# Patient Record
Sex: MALE | Employment: FULL TIME | URBAN - METROPOLITAN AREA
[De-identification: names, ages, dates, MRNs, and addresses within clinical notes are randomized per-mention and may not be internally consistent; named-entity substitution may affect disease eponyms.]

---

## 2021-06-22 ENCOUNTER — TELEPHONE (OUTPATIENT)
Dept: FAMILY MEDICINE CLINIC | Facility: CLINIC | Age: 21
End: 2021-06-22

## 2021-06-23 ENCOUNTER — OFFICE VISIT (OUTPATIENT)
Dept: FAMILY MEDICINE CLINIC | Facility: CLINIC | Age: 21
End: 2021-06-23

## 2021-06-23 VITALS
WEIGHT: 177.4 LBS | BODY MASS INDEX: 26.89 KG/M2 | TEMPERATURE: 97.1 F | HEART RATE: 120 BPM | OXYGEN SATURATION: 98 % | HEIGHT: 68 IN | SYSTOLIC BLOOD PRESSURE: 150 MMHG | DIASTOLIC BLOOD PRESSURE: 78 MMHG

## 2021-06-23 DIAGNOSIS — L50.9 HIVES: Primary | ICD-10-CM

## 2021-06-23 DIAGNOSIS — Z00.01 ENCOUNTER FOR WELL ADULT EXAM WITH ABNORMAL FINDINGS: ICD-10-CM

## 2021-06-23 PROBLEM — H65.111 ACUTE MUCOID OTITIS MEDIA OF RIGHT EAR: Status: ACTIVE | Noted: 2019-09-20

## 2021-06-23 PROBLEM — J06.9 VIRAL URI WITH COUGH: Status: ACTIVE | Noted: 2019-09-20

## 2021-06-23 PROBLEM — F33.1 MODERATE EPISODE OF RECURRENT MAJOR DEPRESSIVE DISORDER (HCC): Status: ACTIVE | Noted: 2021-06-23

## 2021-06-23 PROBLEM — H65.191 ACUTE MUCOID OTITIS MEDIA OF RIGHT EAR: Status: ACTIVE | Noted: 2019-09-20

## 2021-06-23 PROCEDURE — 99203 OFFICE O/P NEW LOW 30 MIN: CPT | Performed by: FAMILY MEDICINE

## 2021-06-23 RX ORDER — METHYLPREDNISOLONE 4 MG/1
TABLET ORAL
COMMUNITY
Start: 2021-06-22 | End: 2021-10-12

## 2021-06-23 NOTE — PROGRESS NOTES
Chief Complaint  Rash, Anxiety, Depression, Sleep Disturbances, Adenopathy, and Establish Care    Subjective          Luis Harris presents to [facility] for     History of Present Illness     Pt is here to [facility]. [facility].     Pt denies having a Physical in the last year.    Pt is unsure if he has been screened for HEP C in the past.    *PT SCORED 24 ON DEPRESSION SCREENING*    Patient states has been having hives on and off for about 2-1/2 years.  He went to the urgent care the other day and was advised to come to primary care provider.  He has not been able to identify a trigger.    He was diagnosed with mono yesterday at the urgent care.    He states he has been depressed for many years, and it has been worse for the past 3-4 years. He has seen a therapist 2-3 years ago. He has never been on any medicine for depression. No SI/HI. He has good support with his family.    Health Maintenance Due   Topic Date Due   • ANNUAL PHYSICAL  Never done   • HEPATITIS C SCREENING  Never done        reports that he has quit smoking. He has never used smokeless tobacco. He reports current alcohol use. He reports current drug use. Drug: Marijuana.    PHQ-9 Depression Screening  Little interest or pleasure in doing things? 3   Feeling down, depressed, or hopeless? 3   Trouble falling or staying asleep, or sleeping too much? 3   Feeling tired or having little energy? 3   Poor appetite or overeating? 3   Feeling bad about yourself - or that you are a failure or have let yourself or your family down? 3   Trouble concentrating on things, such as reading the newspaper or watching television? 3   Moving or speaking so slowly that other people could have noticed? Or the opposite - being so fidgety or restless that you have been moving around a lot more than usual? 0   Thoughts that you would be better off dead, or of hurting yourself in some way? 3   PHQ-9 Total Score 24   If you checked off any problems, how  "difficult have these problems made it for you to do your work, take care of things at home, or get along with other people? Somewhat difficult     No results found for: WBC, HGB, HCT, MCV, PLT  No results found for: GLUCOSE, BUN, CREATININE, EGFRIFNONA, EGFRIFAFRI, BCR, K, CO2, CALCIUM, PROTENTOTREF, ALBUMIN, LABIL2, BILIRUBIN, AST, ALT  No results found for: TSH  No results found for: HGBA1C  Brief Urine Lab Results     None          BP Readings from Last 12 Encounters:   06/23/21 150/78       Wt Readings from Last 12 Encounters:   06/23/21 80.5 kg (177 lb 6.4 oz)       Objective   Vital Signs:   /78 (BP Location: Left arm, Patient Position: Sitting, Cuff Size: Adult) Comment: Pt has taken antihistamines  Pulse 120   Temp 97.1 °F (36.2 °C) (Temporal)   Ht 172.7 cm (68\")   Wt 80.5 kg (177 lb 6.4 oz)   SpO2 98%   BMI 26.97 kg/m²     Physical Exam  Vitals and nursing note reviewed.   Constitutional:       General: He is not in acute distress.     Appearance: He is well-developed. He is not diaphoretic.   HENT:      Head: Normocephalic and atraumatic.   Pulmonary:      Effort: Pulmonary effort is normal.   Skin:     General: Skin is warm and dry.      Comments: There are multiple macular, circular skin lesions on the bilateral lower extremities.  They are well-defined.  He also has macular skin lesions on the bilateral upper extremities.  They are circular and blanchable.   Neurological:      Mental Status: He is alert and oriented to person, place, and time.   Psychiatric:         Behavior: Behavior normal.         Thought Content: Thought content normal.         Judgment: Judgment normal.        Result Review :                 Assessment and Plan    Problem List Items Addressed This Visit     Hives - Primary    Overview     6/23/2021: Patient states has been having hives on and off for about 2-1/2 years.  He went to the urgent care the other day and was advised to come to primary care provider.  He has " not been able to identify a trigger. Refer to Allergist.           Relevant Orders    Ambulatory Referral to Allergy (Completed)      Other Visit Diagnoses     Encounter for well adult exam with abnormal findings        Relevant Orders    CBC & Differential    Comprehensive Metabolic Panel    Lipid Panel With / Chol / HDL Ratio    TSH    UA / M With / Rflx Culture([facility] ONLY) - Urine, Clean Catch          Follow Up   Return in about 3 months (around 9/23/2021) for Annual physical with cbc cmp flp tsh ua prior.  Patient was given instructions and counseling regarding his condition or for health maintenance advice. Please see specific information pulled into the AVS if appropriate.

## 2021-06-23 NOTE — ASSESSMENT & PLAN NOTE
Pt advised and agrees to call today and schedule an appointment with [facility].

## 2021-06-23 NOTE — PATIENT INSTRUCTIONS
[address] [address]    The phone number is [phone]   Walk-in hours are as follows:    Monday: 8:30 am - 2:30 pm  Tuesday:8:30 am - 2:30 pm  Wednesday:8:30 am - 2:30 pm  Thursday:8:30 am - 2:30 pm

## 2021-10-10 DIAGNOSIS — R31.21 ASYMPTOMATIC MICROSCOPIC HEMATURIA: Primary | ICD-10-CM

## 2021-10-12 ENCOUNTER — OFFICE VISIT (OUTPATIENT)
Dept: FAMILY MEDICINE CLINIC | Facility: CLINIC | Age: 21
End: 2021-10-12

## 2021-10-12 VITALS
WEIGHT: 185 LBS | SYSTOLIC BLOOD PRESSURE: 120 MMHG | BODY MASS INDEX: 28.04 KG/M2 | DIASTOLIC BLOOD PRESSURE: 74 MMHG | OXYGEN SATURATION: 97 % | HEART RATE: 85 BPM | HEIGHT: 68 IN | TEMPERATURE: 97.1 F

## 2021-10-12 DIAGNOSIS — R80.8 OTHER PROTEINURIA: ICD-10-CM

## 2021-10-12 DIAGNOSIS — Z00.00 ENCOUNTER FOR WELL ADULT EXAM WITHOUT ABNORMAL FINDINGS: ICD-10-CM

## 2021-10-12 DIAGNOSIS — E66.3 OVERWEIGHT (BMI 25.0-29.9): Primary | ICD-10-CM

## 2021-10-12 PROCEDURE — 99395 PREV VISIT EST AGE 18-39: CPT | Performed by: FAMILY MEDICINE

## 2021-10-12 RX ORDER — TRAZODONE HYDROCHLORIDE 50 MG/1
TABLET ORAL
COMMUNITY
Start: 2021-09-13

## 2021-10-12 RX ORDER — SERTRALINE HYDROCHLORIDE 100 MG/1
TABLET, FILM COATED ORAL
COMMUNITY
Start: 2021-09-14

## 2021-10-12 NOTE — PROGRESS NOTES
Subjective   Luis Harris is a 20 y.o. male is here for annual physical exam.    Chief Complaint   Patient presents with   • Annual Exam       History of Present Illness     Pt is here for his APE. He has no complaints. He states he was not feeling well and was dehydrated when he left his urine sample last week.    Family History   Problem Relation Age of Onset   • Cancer Mother         Ovarian   • Anxiety disorder Mother    • Depression Mother    • No Known Problems Father        Social History     Socioeconomic History   • Marital status: Single   Tobacco Use   • Smoking status: Former Smoker   • Smokeless tobacco: Never Used   Vaping Use   • Vaping Use: Never used   Substance and Sexual Activity   • Alcohol use: Yes     Comment: Ocassionally   • Drug use: Yes     Types: Marijuana   • Sexual activity: Defer         Current Outpatient Medications:   •  sertraline (ZOLOFT) 100 MG tablet, , Disp: , Rfl:   •  traZODone (DESYREL) 50 MG tablet, , Disp: , Rfl:     Review of Systems    PHQ-9 Depression Screening  Little interest or pleasure in doing things? 0   Feeling down, depressed, or hopeless? 0   Trouble falling or staying asleep, or sleeping too much?     Feeling tired or having little energy?     Poor appetite or overeating?     Feeling bad about yourself - or that you are a failure or have let yourself or your family down?     Trouble concentrating on things, such as reading the newspaper or watching television?     Moving or speaking so slowly that other people could have noticed? Or the opposite - being so fidgety or restless that you have been moving around a lot more than usual?     Thoughts that you would be better off dead, or of hurting yourself in some way?     PHQ-9 Total Score 0   If you checked off any problems, how difficult have these problems made it for you to do your work, take care of things at home, or get along with other people?       BP Readings from Last 12 Encounters:   10/12/21 120/74    06/23/21 150/78        Wt Readings from Last 12 Encounters:   10/12/21 83.9 kg (185 lb)   06/23/21 80.5 kg (177 lb 6.4 oz)        Objective   Vitals:    10/12/21 1348   BP: 120/74   Pulse: 85   Temp: 97.1 °F (36.2 °C)   SpO2: 97%     Physical Exam  Vitals and nursing note reviewed.   Constitutional:       General: He is not in acute distress.     Appearance: He is well-developed. He is not diaphoretic.   HENT:      Head: Normocephalic and atraumatic.      Right Ear: External ear normal.      Left Ear: External ear normal.      Nose: Nose normal.      Mouth/Throat:      Pharynx: No oropharyngeal exudate.   Eyes:      General: No scleral icterus.        Right eye: No discharge.         Left eye: No discharge.      Conjunctiva/sclera: Conjunctivae normal.      Pupils: Pupils are equal, round, and reactive to light.   Neck:      Thyroid: No thyromegaly.      Vascular: No JVD.      Trachea: No tracheal deviation.   Cardiovascular:      Rate and Rhythm: Normal rate and regular rhythm.      Heart sounds: Normal heart sounds. No murmur heard.  No friction rub. No gallop.    Pulmonary:      Effort: Pulmonary effort is normal. No respiratory distress.      Breath sounds: Normal breath sounds. No stridor. No wheezing or rales.   Abdominal:      General: Bowel sounds are normal. There is no distension.      Palpations: Abdomen is soft. There is no mass.      Tenderness: There is no abdominal tenderness. There is no guarding or rebound.      Hernia: No hernia is present.   Genitourinary:     Penis: Normal.       Testes: Normal.   Musculoskeletal:         General: No tenderness or deformity. Normal range of motion.      Cervical back: Normal range of motion and neck supple.   Lymphadenopathy:      Cervical: No cervical adenopathy.   Skin:     General: Skin is warm and dry.      Coloration: Skin is not pale.      Findings: No erythema or rash.   Neurological:      Mental Status: He is alert and oriented to person, place, and  time.      Cranial Nerves: No cranial nerve deficit.      Sensory: No sensory deficit.      Motor: No abnormal muscle tone.      Coordination: Coordination normal.      Deep Tendon Reflexes: Reflexes normal.   Psychiatric:         Behavior: Behavior normal.         Thought Content: Thought content normal.         Judgment: Judgment normal.         Procedures      Assessment/Plan   Diagnoses and all orders for this visit:    1. Overweight (BMI 25.0-29.9) (Primary)    2. Other proteinuria  Assessment & Plan:   Repeat UA today.      3. Encounter for well adult exam without abnormal findings               No follow-ups on file.

## 2021-10-12 NOTE — PATIENT INSTRUCTIONS
Preventive Care 18-21 Years Old, Male  Preventive care refers to lifestyle choices and visits with your health care provider that can promote health and wellness. At this stage in your life, you may start seeing a primary care physician instead of a pediatrician. It is important to take responsibility for your health and well-being. Preventive care for young adults includes:  · A yearly physical exam. This is also called an annual wellness visit.  · Regular dental and eye exams.  · Immunizations.  · Screening for certain conditions.  · Healthy lifestyle choices, such as:  ? Eating a healthy diet.  ? Getting regular exercise.  ? Not using drugs or products that contain nicotine and tobacco.  ? Limiting alcohol use.  What can I expect for my preventive care visit?  Physical exam  Your health care provider may check your:  · Height and weight. These may be used to calculate your BMI (body mass index). BMI is a measurement that tells if you are at a healthy weight.  · Heart rate and blood pressure.  · Body temperature.  · Skin for abnormal spots.  Counseling  Your health care provider may ask you questions about your:  · Past medical problems.  · Family's medical history.  · Alcohol, tobacco, and drug use.  · Home life and relationship well-being.  · Access to firearms.  · Emotional well-being.  · Diet, exercise, and sleep habits.  · Sexual activity and sexual health.  What immunizations do I need?    Vaccines are usually given at various ages, according to a schedule. Your health care provider will recommend vaccines for you based on your age, medical history, and lifestyle or other factors, such as travel or where you work.  What tests do I need?  Blood tests  · Lipid and cholesterol levels. These may be checked every 5 years starting at age 20.  · Hepatitis C test.  · Hepatitis B test.  Screening  · Genital exam to check for testicular cancer or hernias.  · STD (sexually transmitted disease) testing, if you are at  risk.  Other tests  · Tuberculosis skin test.  · Vision and hearing tests.  · Skin exam.  Talk with your health care provider about your test results, treatment options, and if necessary, the need for more tests.  Follow these instructions at home:  Eating and drinking    · Eat a healthy diet that includes fresh fruits and vegetables, whole grains, lean protein, and low-fat dairy products.  · Drink enough fluid to keep your urine pale yellow.  · Do not drink alcohol if:  ? Your health care provider tells you not to drink.  ? You are under the legal drinking age. In the [place] the legal drinking age is 21.  · If you drink alcohol:  ? Limit how much you use to 0-2 drinks a day.  ? Be aware of how much alcohol is in your drink. In the [place] one drink equals one 12 oz bottle of beer (355 mL), one 5 oz glass of wine (148 mL), or one 1½ oz glass of hard liquor (44 mL).    Lifestyle  · Take daily care of your teeth and gums. Brush your teeth every morning and night with fluoride toothpaste. Floss one time each day.  · Stay active. Exercise for at least 30 minutes 5 or more days of the week.  · Do not use any products that contain nicotine or tobacco, such as cigarettes, e-cigarettes, and chewing tobacco. If you need help quitting, ask your health care provider.  · Do not use drugs.  · If you are sexually active, practice safe sex. Use a condom or other form of protection to prevent STIs (sexually transmitted infections).  · Find healthy ways to cope with stress, such as:  ? Meditation, yoga, or listening to music.  ? Journaling.  ? Talking to a trusted person.  ? Spending time with friends and family.  Safety  · Always wear your seat belt while driving or riding in a vehicle.  · Do not drive:  ? If you have been drinking alcohol. Do not ride with someone who has been drinking.  ? When you are tired or distracted.  ? While texting.  · Wear a helmet and other protective equipment during sports activities.  · If you have  firearms in your house, make sure you follow all gun safety procedures.  · Seek help if you have been bullied, physically abused, or sexually abused.  · Use the Internet responsibly to avoid dangers, such as online bullying and online sex predators.  What's next?  · Go to your health care provider once a year for an annual wellness visit.  · Ask your health care provider how often you should have your eyes and teeth checked.  · Stay up to date on all vaccines.  This information is not intended to replace advice given to you by your health care provider. Make sure you discuss any questions you have with your health care provider.  Document Revised: 09/02/2020 Document Reviewed: 12/12/2019  Elsevier Patient Education © 2021 [employer]

## 2021-10-13 LAB
APPEARANCE UR: CLEAR
BACTERIA #/AREA URNS HPF: NORMAL /HPF
BILIRUB UR QL STRIP: NEGATIVE
CASTS URNS QL MICRO: NORMAL /LPF
COLOR UR: YELLOW
EPI CELLS #/AREA URNS HPF: NORMAL /HPF (ref 0–10)
GLUCOSE UR QL: NEGATIVE
HGB UR QL STRIP: NEGATIVE
KETONES UR QL STRIP: NEGATIVE
LEUKOCYTE ESTERASE UR QL STRIP: NEGATIVE
MICRO URNS: ABNORMAL
MICRO URNS: ABNORMAL
NITRITE UR QL STRIP: NEGATIVE
PH UR STRIP: 8 [PH] (ref 5–7.5)
PROT UR QL STRIP: NEGATIVE
RBC #/AREA URNS HPF: NORMAL /HPF (ref 0–2)
SP GR UR: 1.01 (ref 1–1.03)
URINALYSIS REFLEX: ABNORMAL
UROBILINOGEN UR STRIP-MCNC: 0.2 MG/DL (ref 0.2–1)
WBC #/AREA URNS HPF: NORMAL /HPF (ref 0–5)

## 2024-01-19 ENCOUNTER — HOSPITAL ENCOUNTER (EMERGENCY)
Facility: HOSPITAL | Age: 24
Discharge: HOME | End: 2024-01-19
Attending: EMERGENCY MEDICINE
Payer: COMMERCIAL

## 2024-01-19 ENCOUNTER — APPOINTMENT (OUTPATIENT)
Dept: GENERAL RADIOLOGY | Facility: HOSPITAL | Age: 24
End: 2024-01-19
Payer: COMMERCIAL

## 2024-01-19 VITALS
HEIGHT: 68 IN | HEART RATE: 113 BPM | OXYGEN SATURATION: 99 % | WEIGHT: 184.97 LBS | SYSTOLIC BLOOD PRESSURE: 165 MMHG | TEMPERATURE: 98.1 F | DIASTOLIC BLOOD PRESSURE: 109 MMHG | RESPIRATION RATE: 16 BRPM | BODY MASS INDEX: 28.03 KG/M2

## 2024-01-19 DIAGNOSIS — S62.609B PHALANX, HAND FRACTURE, OPEN, INITIAL ENCOUNTER: ICD-10-CM

## 2024-01-19 DIAGNOSIS — I47.10 SVT (SUPRAVENTRICULAR TACHYCARDIA): Primary | ICD-10-CM

## 2024-01-19 DIAGNOSIS — S61.012A LACERATION OF LEFT THUMB WITHOUT FOREIGN BODY, NAIL DAMAGE STATUS UNSPECIFIED, INITIAL ENCOUNTER: ICD-10-CM

## 2024-01-19 LAB
QT INTERVAL: 251 MS
QTC INTERVAL: 482 MS

## 2024-01-19 PROCEDURE — 73130 X-RAY EXAM OF HAND: CPT

## 2024-01-19 PROCEDURE — 93010 ELECTROCARDIOGRAM REPORT: CPT | Performed by: INTERNAL MEDICINE

## 2024-01-19 PROCEDURE — 25010000002 BUPIVACAINE (PF) 0.5 % SOLUTION: Performed by: EMERGENCY MEDICINE

## 2024-01-19 PROCEDURE — 99283 EMERGENCY DEPT VISIT LOW MDM: CPT

## 2024-01-19 PROCEDURE — 93005 ELECTROCARDIOGRAM TRACING: CPT | Performed by: EMERGENCY MEDICINE

## 2024-01-19 RX ORDER — CEPHALEXIN 500 MG/1
500 CAPSULE ORAL ONCE
Status: COMPLETED | OUTPATIENT
Start: 2024-01-19 | End: 2024-01-19

## 2024-01-19 RX ORDER — BUPIVACAINE HYDROCHLORIDE 5 MG/ML
10 INJECTION, SOLUTION EPIDURAL; INTRACAUDAL ONCE
Status: COMPLETED | OUTPATIENT
Start: 2024-01-19 | End: 2024-01-19

## 2024-01-19 RX ORDER — CEPHALEXIN 500 MG/1
500 CAPSULE ORAL 3 TIMES DAILY
Qty: 21 CAPSULE | Refills: 0 | Status: SHIPPED | OUTPATIENT
Start: 2024-01-19 | End: 2024-01-26

## 2024-01-19 RX ADMIN — CEPHALEXIN 500 MG: 500 CAPSULE ORAL at 16:09

## 2024-01-19 RX ADMIN — BUPIVACAINE HYDROCHLORIDE 10 ML: 5 INJECTION, SOLUTION EPIDURAL; INTRACAUDAL; PERINEURAL at 15:17

## 2024-01-19 NOTE — DISCHARGE INSTRUCTIONS
Take antibiotic as directed.  Keep wound clean and covered until it is well-healed.  Must follow-up with hand surgery specialist next week for repeat evaluation and wound check as we discussed.  Also recommend follow-up with cardiologist for further evaluation of your elevated heart rate.  Please return to the emergency room for any worsening pain, swelling, redness, fevers, weakness, numbness in the finger or any other concerns.

## 2024-01-19 NOTE — ED PROVIDER NOTES
Laceration Repair    Date/Time: 1/19/2024 5:26 PM    Performed by: Janiya Goldberg PA  Authorized by: Elroy Rojas MD    Consent:     Consent obtained:  Verbal    Consent given by:  Patient    Risks, benefits, and alternatives were discussed: yes      Risks discussed:  Infection, need for additional repair and poor cosmetic result    Alternatives discussed:  No treatment  Universal protocol:     Patient identity confirmed:  Verbally with patient and arm band  Anesthesia:     Anesthesia method:  Local infiltration    Local anesthetic:  Bupivacaine 0.5% w/o epi  Laceration details:     Location:  Finger    Finger location:  L thumb    Length (cm):  3    Depth (mm):  3  Pre-procedure details:     Preparation:  Patient was prepped and draped in usual sterile fashion and imaging obtained to evaluate for foreign bodies  Exploration:     Hemostasis achieved with:  Direct pressure    Imaging outcome: foreign body not noted    Treatment:     Area cleansed with:  Chlorhexidine    Amount of cleaning:  Standard    Irrigation solution:  Sterile saline    Irrigation method:  Pressure wash    Visualized foreign bodies/material removed: no      Debridement:  None    Undermining:  None  Skin repair:     Repair method:  Sutures    Suture size:  4-0    Suture material:  Nylon    Suture technique:  Simple interrupted    Number of sutures:  6  Approximation:     Approximation:  Close  Repair type:     Repair type:  Simple  Post-procedure details:     Dressing:  Antibiotic ointment and non-adherent dressing    Procedure completion:  Tolerated well, no immediate complications         Janiya Goldberg PA  01/19/24 [id number]

## 2024-01-19 NOTE — ED PROVIDER NOTES
EMERGENCY DEPARTMENT ENCOUNTER  Room Number:  34/34  PCP: Jorge A Jones Jr.,   Independent Historians: Patient      HPI:  Chief Complaint: Thumb laceration    A complete HPI/ROS/PMH/PSH/SH/FH are unobtainable due to: None    Chronic or social conditions impacting patient care (Social Determinants of Health): None      Context: Luis Harris is a 23 y.o. male with a medical history of anxiety and depression who presents to the ED c/o acute laceration of the left thumb.  Patient works for a [employer].  Today he was doing his usual work duties with moving some objects.  Unfortunately because of the weather there was some snow and ice on the ground which caused him to slip suddenly and he struck his left hand against some aspect of the truck which resulted in a laceration to the left thumb.  This created a lot of bleeding to the thumb.  Patient applied some tissues to the thumb to try and stop the bleedin.  He did not hit his head or have any loss of consciousness.  He denies any other areas of injury or concern.      Review of prior external notes (non-ED) -and- Review of prior external test results outside of this encounter: I reviewed the PCP progress note from October 12, 2021 when patient had evaluation for proteinuria and encounter for well adult exam      PAST MEDICAL HISTORY  Active Ambulatory Problems     Diagnosis Date Noted    Acute mucoid otitis media of right ear 09/20/2019    Viral URI with cough 09/20/2019    Hives 06/23/2021    Moderate episode of recurrent major depressive disorder 06/23/2021    Asymptomatic microscopic hematuria 10/10/2021    Other proteinuria 10/12/2021     Resolved Ambulatory Problems     Diagnosis Date Noted    No Resolved Ambulatory Problems     Past Medical History:   Diagnosis Date    Anxiety     Depression     Insomnia     Monocytoses     Sleep disturbances          PAST SURGICAL HISTORY  History reviewed. No pertinent surgical history.      FAMILY  HISTORY  Family History   Problem Relation Age of Onset    Cancer Mother         Ovarian    Anxiety disorder Mother     Depression Mother     No Known Problems Father          SOCIAL HISTORY  Social History     Socioeconomic History    Marital status: Single   Tobacco Use    Smoking status: Former    Smokeless tobacco: Never   Vaping Use    Vaping Use: Never used   Substance and Sexual Activity    Alcohol use: Yes     Comment: Ocassionally    Drug use: Yes     Types: Marijuana    Sexual activity: Defer         ALLERGIES  Patient has no known allergies.      REVIEW OF SYSTEMS  Review of Systems  Included in HPI  All systems reviewed and negative except for those discussed in HPI.      PHYSICAL EXAM    I have reviewed the triage vital signs and nursing notes.    ED Triage Vitals   Temp Heart Rate Resp BP SpO2   01/19/24 1418 01/19/24 1418 01/19/24 1418 01/19/24 1433 01/19/24 1418   98.1 °F (36.7 °C) (!) 247 16 (!) 163/111 98 %      Temp src Heart Rate Source Patient Position BP Location FiO2 (%)   01/19/24 1418 01/19/24 1418 -- -- --   Tympanic Monitor          Physical Exam  GENERAL: Appears anxious, alert, no acute distress  SKIN: Warm, dry, no diaphoresis  HENT: Normocephalic, atraumatic  EYES: no scleral icterus, EOMI  CV: regular rhythm, tachycardic rate, no murmurs  RESPIRATORY: normal effort, lungs clear to auscultation bilaterally  ABDOMEN: soft, nontender, nondistended  MUSCULOSKELETAL: There is a laceration notable to the left first digit extending from the MCP distally beyond the interphalangeal joint at the dorsal lateral aspect.  Patient is able to flex and extend the thumb appropriately at the MCP and IP joints.  Distal sensation is intact at the tip of the thumb.  There does not appear to be any foreign body within the wound.  There is mild oozing of dark red blood present from the wound.  NEURO: alert, moves all extremities, follows commands, no focal motor or sensory deficits      NIH:                                                              LAB RESULTS  Recent Results (from the past 24 hour(s))   ECG 12 Lead Rhythm Change    Collection Time: 01/19/24  2:21 PM   Result Value Ref Range    QT Interval 251 ms    QTC Interval 482 ms   ECG 12 Lead Rhythm Change    Collection Time: 01/19/24  2:30 PM   Result Value Ref Range    QT Interval 279 ms    QTC Interval 422 ms         RADIOLOGY  XR Hand 3+ View Left    Result Date: 1/19/2024  XR HAND 3+ VW LEFT-  INDICATIONS: Thumb laceration  TECHNIQUE: 3 VIEWS OF THE LEFT HAND  COMPARISON: None available  FINDINGS:  Soft tissue swelling and injury/laceration are apparent at the dorsal lateral aspect of the mid thumb, with apparent avulsion fracture fragments, compatible with open fracture (versus foreign bodies), at the dorsal aspect of the base of the first distal phalanx. No other radiodensities are noted in the soft tissues in this region. No other evidence of fracture. No dislocation.       As described.    This report was finalized on 1/19/2024 3:02 PM by Dr. Lamont Mazariegos M.D on Workstation: [id number]         MEDICATIONS GIVEN IN ER  Medications   bupivacaine (PF) (MARCAINE) 0.5 % injection 10 mL (10 mL Injection Given by Other 1/19/24 9857)   cephalexin (KEFLEX) capsule 500 mg (500 mg Oral Given 1/19/24 1609)         ORDERS PLACED DURING THIS VISIT:  Orders Placed This Encounter   Procedures    Laceration Repair    XR Hand 3+ View Left    Ambulatory Referral to Cardiology    Please apply finger splint to the left thumb after laceration wound is repaired with sutures and dressed with gauze  Nursing Communication    ECG 12 Lead Rhythm Change    ECG 12 Lead Rhythm Change         OUTPATIENT MEDICATION MANAGEMENT:  No current Epic-ordered facility-administered medications on file.     Current Outpatient Medications Ordered in Epic   Medication Sig Dispense Refill    cephalexin (KEFLEX) 500 MG capsule Take 1 capsule by mouth 3 (Three) Times a Day for 7 days. 21  capsule 0    sertraline (ZOLOFT) 100 MG tablet       traZODone (DESYREL) 50 MG tablet            PROCEDURES  Procedures            PROGRESS, DATA ANALYSIS, CONSULTS, AND MEDICAL DECISION MAKING  All labs have been independently interpreted by me.  All radiology studies have been reviewed by me. All EKG's have been independently viewed and interpreted by me.  Discussion below represents my analysis of pertinent findings related to patient's condition, differential diagnosis, treatment plan and final disposition.    Differential diagnosis includes but is not limited to SVT, atrial fibrillation, atrial flutter, thumb laceration, finger fracture.    Clinical Scores:                  ED Course as of 01/19/24 2233   Fri Jan 19, 2024   1432 Patient just arrived via triage for evaluation of his thumb laceration.  When checking vital signs, though, he was found to have a heart rate in the 220-240 range.  Patient tells me he is feeling very anxious.  He also tells me that he smoked 3 cigarettes prior to coming in here.  I think these are probably playing a role with his tachycardia but obviously at this fast, it is consistent with SVT. [PAULA]   1433 We established an IV in the right antecubital fossa.  I had patient perform a Valsalva maneuver, holding his breath and valdez his abdominal muscles for approximately 30 seconds.  Then I put him in reverse Trendelenburg.  Fortunately, this maneuver worked excellently and he is now in a much more appropriate rate at approximately 100 2130 bpm.  We will continue to monitor him on telemetry. [PAULA]   1446 EKG         EKG time/Interp time: 1421/1431  Rhythm/Rate: SVT, 221 bpm  P waves and UT: Present, 158 ms  QRS, axis: 93 ms, normal axis  ST and T waves: Interpretation is somewhat limited because of excessive rate.  However there is no significant ST elevation pattern present.  There is ST depression which is almost certainly rate related  Independently interpreted by me  "contemporaneously with treatment   [PAULA]   1446 EKG         EKG time/Interp time: 1430/1432 (immediately after vagal maneuver)  Rhythm/Rate: Sinus tachycardia, 137 bpm  P waves and MD: Present, 125 ms  QRS, axis: 108 ms, normal axis  ST and T waves: No ST segment elevations are present.  Independently interpreted by me contemporaneously with treatment   [PAULA]   1546 I independently interpreted the x-ray of the left hand and my findings are: No dislocation, no obvious radiopaque foreign body evident.  There does appear to be small avulsion fracture present at the base of the phalanx [PAULA]   1546 Reassessed the patient and he is relaxing more comfortably.  Heart rate is now in the low 100s range. [PAULA]   1617 KIM SPENCER, performed digital block on the left thumb.  The wound has been copiously irrigated.  She will repair the laceration with sutures as appropriate.  Patient's heart rate remains in a sinus tachycardic rhythm.  Patient cardiology to discuss patient's new onset SVT today. [PAULA]   1625 I discussed with Dr. Souza from [facility] about the patient.  He is satisfied with the patient's current heart rate in the 115-120 range.  He feels there is no need for medications or any other kind of therapy right now.  He recommends that the patient simply follow-up with him in the office in the next 2 to 3 weeks for repeat evaluation.  We will give the patient resource information to do so. [PAULA]   1630 I discussed the plan of care with the patient at the bedside.  Explained that once his laceration is repaired, he will be discharged home with a prescription for antibiotics and he will need to follow-up hand surgery specialist next week for repeat evaluation.  Will give him resource information to follow-up with Dr. Aaron.  Also explained that he will need to follow-up in the cardiology office for repeat evaluation.  Resource information provided.  Will discuss with him usual \"return to ER\" instructions prior " to discharge as well. [PAULA]      ED Course User Index  [PAULA] Elroy Rojas MD           AS OF 22:33 EST VITALS:    BP - (!) 165/109  HR - 113  TEMP - 98.1 °F (36.7 °C) (Tympanic)  O2 SATS - 99%    COMPLEXITY OF CARE  Admission was considered but after careful review of the patient's presentation, physical examination, diagnostic results, and response to treatment the patient may be safely discharged with outpatient follow-up.      DIAGNOSIS  Final diagnoses:   SVT (supraventricular tachycardia)   Laceration of left thumb without foreign body, nail damage status unspecified, initial encounter   Phalanx, hand fracture, open, initial encounter         DISPOSITION  ED Disposition       ED Disposition   Discharge    Condition   Stable    Comment   --                Please note that portions of this document were completed with a voice recognition program.    Note Disclaimer: At [facility], we believe that sharing information builds trust and better relationships. You are receiving this note because you recently visited [facility]. It is possible you will see health information before a provider has talked with you about it. This kind of information can be easy to misunderstand. To help you fully understand what it means for your health, we urge you to discuss this note with your provider.         Elroy Rojas MD  01/19/24 1631       Elroy Rojas MD  01/19/24 1032

## 2024-01-20 LAB
QT INTERVAL: 279 MS
QTC INTERVAL: 422 MS

## 2024-03-08 ENCOUNTER — OFFICE VISIT (OUTPATIENT)
Dept: INTERNAL MEDICINE | Facility: CLINIC | Age: 24
End: 2024-03-08
Payer: COMMERCIAL

## 2024-03-08 VITALS
HEIGHT: 68 IN | HEART RATE: 95 BPM | BODY MASS INDEX: 34.65 KG/M2 | SYSTOLIC BLOOD PRESSURE: 140 MMHG | DIASTOLIC BLOOD PRESSURE: 90 MMHG | WEIGHT: 228.6 LBS | TEMPERATURE: 98.4 F | OXYGEN SATURATION: 96 %

## 2024-03-08 DIAGNOSIS — F33.1 MODERATE EPISODE OF RECURRENT MAJOR DEPRESSIVE DISORDER: ICD-10-CM

## 2024-03-08 DIAGNOSIS — F41.1 GAD (GENERALIZED ANXIETY DISORDER): ICD-10-CM

## 2024-03-08 DIAGNOSIS — Z76.89 ENCOUNTER TO ESTABLISH CARE: Primary | ICD-10-CM

## 2024-03-08 DIAGNOSIS — Z00.00 ROUTINE HEALTH MAINTENANCE: ICD-10-CM

## 2024-03-08 PROBLEM — H65.111 ACUTE MUCOID OTITIS MEDIA OF RIGHT EAR: Status: RESOLVED | Noted: 2019-09-20 | Resolved: 2024-03-08

## 2024-03-08 PROBLEM — R80.8 OTHER PROTEINURIA: Status: RESOLVED | Noted: 2021-10-12 | Resolved: 2024-03-08

## 2024-03-08 PROBLEM — J06.9 VIRAL URI WITH COUGH: Status: RESOLVED | Noted: 2019-09-20 | Resolved: 2024-03-08

## 2024-03-08 PROBLEM — L50.9 HIVES: Status: RESOLVED | Noted: 2021-06-23 | Resolved: 2024-03-08

## 2024-03-08 PROBLEM — R31.21 ASYMPTOMATIC MICROSCOPIC HEMATURIA: Status: RESOLVED | Noted: 2021-10-10 | Resolved: 2024-03-08

## 2024-03-08 PROBLEM — H65.191 ACUTE MUCOID OTITIS MEDIA OF RIGHT EAR: Status: RESOLVED | Noted: 2019-09-20 | Resolved: 2024-03-08

## 2024-03-08 RX ORDER — BUSPIRONE HYDROCHLORIDE 5 MG/1
5 TABLET ORAL 2 TIMES DAILY
Qty: 60 TABLET | Refills: 1 | Status: SHIPPED | OUTPATIENT
Start: 2024-03-08

## 2024-03-08 NOTE — PROGRESS NOTES
Subjective    Luis Harris is a 23 y.o. male presenting today for   Chief Complaint   Patient presents with    Establish Care    Depression    Anxiety         Luis Harris presents today as a new patient to me to establish care.   Prior PCP was Ger Jones.      Current/chronic health conditions include:    Patient Active Problem List   Diagnosis    Moderate episode of recurrent major depressive disorder       No outpatient medications have been marked as taking for the 3/8/24 encounter (Office Visit) with Azalea Beal APRN.       Mental health - chronic since about age 13. Depression and anxiety, social anxiety, occas panic attacks. Took SSRI for 6-8mo. Off of this for about 1yr. Sertraline 150mg and Buspar.     PHQ-9 Depression Screening  Little interest or pleasure in doing things? 2-->more than half the days   Feeling down, depressed, or hopeless? 2-->more than half the days   Trouble falling or staying asleep, or sleeping too much? 2-->more than half the days   Feeling tired or having little energy? 2-->more than half the days   Poor appetite or overeating? 3-->nearly every day   Feeling bad about yourself - or that you are a failure or have let yourself or your family down? 3-->nearly every day   Trouble concentrating on things, such as reading the newspaper or watching television? 3-->nearly every day   Moving or speaking so slowly that other people could have noticed? Or the opposite - being so fidgety or restless that you have been moving around a lot more than usual? 3-->nearly every day   Thoughts that you would be better off dead, or of hurting yourself in some way? 0-->not at all   PHQ-9 Total Score 20   If you checked off any problems, how difficult have these problems made it for you to do your work, take care of things at home, or get along with other people? very difficult       Over the last two weeks, how often have you been bothered by the following problems?  Feeling nervous,  "anxious or on edge: Nearly every day  Not being able to stop or control worrying: Nearly every day  Worrying too much about different things: Nearly every day  Trouble Relaxing: Nearly every day  Being so restless that it is hard to sit still: Nearly every day  Becoming easily annoyed or irritable: Not at all  Feeling afraid as if something awful might happen: More than half the days  AURELIO 7 Total Score: 17        The following portions of the patient's history were reviewed and updated as appropriate: allergies, current medications, problem list, past medical history, past surgical history, family history, and social history.     Review of Systems   Psychiatric/Behavioral:  Negative for self-injury and suicidal ideas.        Objective    Vitals:    03/08/24 1334 03/08/24 1358   BP: 160/84 140/90   BP Location: Left arm    Patient Position: Sitting    Cuff Size: Large Adult    Pulse: 95    Temp: 98.4 °F (36.9 °C)    TempSrc: Infrared    SpO2: 96%    Weight: 104 kg (228 lb 9.6 oz)    Height: 172.7 cm (68\")      Body mass index is 34.76 kg/m².  Nursing notes and vitals reviewed.    Physical Exam  Constitutional:       General: He is not in acute distress.     Appearance: He is well-developed.   HENT:      Right Ear: Tympanic membrane, ear canal and external ear normal.      Left Ear: Tympanic membrane, ear canal and external ear normal.      Nose: Nose normal.      Mouth/Throat:      Mouth: Mucous membranes are moist.      Pharynx: Oropharynx is clear. Uvula midline.   Eyes:      Conjunctiva/sclera: Conjunctivae normal.   Neck:      Thyroid: No thyroid mass or thyromegaly.   Cardiovascular:      Rate and Rhythm: Regular rhythm.      Pulses: Normal pulses.      Heart sounds: S1 normal and S2 normal. No murmur heard.     No friction rub. No gallop.   Pulmonary:      Effort: Pulmonary effort is normal.      Breath sounds: Normal breath sounds. No wheezing, rhonchi or rales.   Musculoskeletal:      Cervical back: Neck " supple.   Lymphadenopathy:      Cervical: No cervical adenopathy.   Neurological:      Mental Status: He is alert and oriented to person, place, and time.   Psychiatric:         Attention and Perception: He is attentive.         Speech: Speech normal.         Behavior: Behavior normal.         Thought Content: Thought content normal.         No results found for this or any previous visit (from the past 672 hour(s)).      Assessment and Plan    Diagnoses and all orders for this visit:    1. Encounter to establish care (Primary)    2. Moderate episode of recurrent major depressive disorder  -     sertraline (Zoloft) 50 MG tablet; Take 1 tablet by mouth Daily.  Dispense: 30 tablet; Refill: 1    3. AURELIO (generalized anxiety disorder)  -     sertraline (Zoloft) 50 MG tablet; Take 1 tablet by mouth Daily.  Dispense: 30 tablet; Refill: 1  -     busPIRone (BUSPAR) 5 MG tablet; Take 1 tablet by mouth 2 (Two) Times a Day.  Dispense: 60 tablet; Refill: 1        2. Chronic but new to examiner and uncontrolled.  Start Zoloft.    3. Chronic but new too the examiner and uncontrolled.  Start Zoloft and Buspar.      Medications, including side effects, were discussed with the patient. Patient verbalized understanding.  The plan of care was discussed. All questions were answered. Patient verbalized understanding.        Return in about 6 weeks (around 4/19/2024) for fasting labs one week prior to, Annual physical.

## 2024-05-04 DIAGNOSIS — F33.1 MODERATE EPISODE OF RECURRENT MAJOR DEPRESSIVE DISORDER: ICD-10-CM

## 2024-05-04 DIAGNOSIS — F41.1 GAD (GENERALIZED ANXIETY DISORDER): ICD-10-CM

## 2024-05-05 DIAGNOSIS — F41.1 GAD (GENERALIZED ANXIETY DISORDER): ICD-10-CM

## 2024-05-06 RX ORDER — BUSPIRONE HYDROCHLORIDE 5 MG/1
5 TABLET ORAL 2 TIMES DAILY
Qty: 60 TABLET | Refills: 1 | Status: SHIPPED | OUTPATIENT
Start: 2024-05-06

## 2024-07-21 DIAGNOSIS — F33.1 MODERATE EPISODE OF RECURRENT MAJOR DEPRESSIVE DISORDER: ICD-10-CM

## 2024-07-21 DIAGNOSIS — F41.1 GAD (GENERALIZED ANXIETY DISORDER): ICD-10-CM

## 2024-07-22 RX ORDER — BUSPIRONE HYDROCHLORIDE 5 MG/1
5 TABLET ORAL 2 TIMES DAILY
Qty: 60 TABLET | Refills: 1 | OUTPATIENT
Start: 2024-07-22

## 2024-12-02 ENCOUNTER — HOSPITAL ENCOUNTER (EMERGENCY)
Facility: HOSPITAL | Age: 24
Discharge: HOME | End: 2024-12-02
Attending: STUDENT IN AN ORGANIZED HEALTH CARE EDUCATION/TRAINING PROGRAM | Admitting: STUDENT IN AN ORGANIZED HEALTH CARE EDUCATION/TRAINING PROGRAM
Payer: OTHER MISCELLANEOUS

## 2024-12-02 VITALS
OXYGEN SATURATION: 96 % | WEIGHT: 220 LBS | BODY MASS INDEX: 33.34 KG/M2 | HEIGHT: 68 IN | RESPIRATION RATE: 20 BRPM | HEART RATE: 117 BPM | TEMPERATURE: 98 F

## 2024-12-02 DIAGNOSIS — S61.219A FINGER LACERATION, INITIAL ENCOUNTER: Primary | ICD-10-CM

## 2024-12-02 PROCEDURE — 99282 EMERGENCY DEPT VISIT SF MDM: CPT | Performed by: STUDENT IN AN ORGANIZED HEALTH CARE EDUCATION/TRAINING PROGRAM

## 2024-12-02 NOTE — DISCHARGE INSTRUCTIONS
Keep the wound clean and dry.  If it does open up I recommend wearing a Band-Aid, applying pressure to stop the bleeding.  Wear gloves at work.  Do not submerge the finger in water for the first 3 days however it is okay to take a shower.  Wound should be healed up in the next 7 days.

## 2024-12-02 NOTE — ED PROVIDER NOTES
EMERGENCY DEPARTMENT ENCOUNTER      Room Number: 14/14    History is provided by the patient, no translation services needed    HPI:    Chief complaint: Finger laceration    Narrative: Pt is a 23 y.o. male who presents complaining of finger laceration.  Patient works at [employer] and was opening boxes when he cut himself with a box knife.  Reports he had a tetanus shot last year when he injured his other hand.  Patient reports no numbness or tingling.  No difficulty in moving the finger.  Reports he was here because work sent to him in.      PMD: Azalea Beal APRN    REVIEW OF SYSTEMS  Review of Systems  Complete review of systems performed otherwise negative except pertinent positives per HPI.    PAST MEDICAL HISTORY  Active Ambulatory Problems     Diagnosis Date Noted    Moderate episode of recurrent major depressive disorder 06/23/2021     Resolved Ambulatory Problems     Diagnosis Date Noted    Acute mucoid otitis media of right ear 09/20/2019    Viral URI with cough 09/20/2019    Hives 06/23/2021    Asymptomatic microscopic hematuria 10/10/2021    Other proteinuria 10/12/2021     Past Medical History:   Diagnosis Date    Anxiety     Depression     Insomnia     Sleep disturbances        PAST SURGICAL HISTORY  No past surgical history on file.    FAMILY HISTORY  Family History   Problem Relation Age of Onset    Cancer Mother         Ovarian    Anxiety disorder Mother     Depression Mother     Hypertension Mother     Hypertension Father        SOCIAL HISTORY  Social History     Socioeconomic History    Marital status: Single   Tobacco Use    Smoking status: Former     Average packs/day: 0.3 packs/day for 4.2 years (1.0 ttl pk-yrs)     Types: Cigarettes     Start date: 01/2019     Passive exposure: Never    Smokeless tobacco: Never   Vaping Use    Vaping status: Never Used   Substance and Sexual Activity    Alcohol use: Yes     Alcohol/week: 40.0 standard drinks of alcohol     Types: 40 Standard drinks or  equivalent per week    Drug use: Yes     Types: Marijuana     Comment: inhaled and edible    Sexual activity: Not Currently       ALLERGIES  Patient has no known allergies.    No current facility-administered medications for this encounter.    Current Outpatient Medications:     busPIRone (BUSPAR) 5 MG tablet, TAKE 1 TABLET BY MOUTH TWICE A DAY, Disp: 60 tablet, Rfl: 1    sertraline (ZOLOFT) 50 MG tablet, TAKE 1 TABLET BY MOUTH EVERY DAY, Disp: 30 tablet, Rfl: 1    PHYSICAL EXAM  ED Triage Vitals [12/02/24 1339]   Temp Heart Rate Resp BP SpO2   98 °F (36.7 °C) 117 20 -- 96 %      Temp src Heart Rate Source Patient Position BP Location FiO2 (%)   Oral -- -- -- --       Physical Exam  Vitals and nursing note reviewed.   Constitutional:       Appearance: Normal appearance. He is normal weight. He is not ill-appearing or toxic-appearing.   HENT:      Head: Normocephalic and atraumatic.      Nose: Nose normal.      Mouth/Throat:      Mouth: Mucous membranes are moist.   Eyes:      Extraocular Movements: Extraocular movements intact.      Conjunctiva/sclera: Conjunctivae normal.      Pupils: Pupils are equal, round, and reactive to light.   Cardiovascular:      Rate and Rhythm: Regular rhythm. Tachycardia present.   Pulmonary:      Effort: Pulmonary effort is normal.   Abdominal:      General: Abdomen is flat.      Palpations: Abdomen is soft.      Tenderness: There is no abdominal tenderness.   Musculoskeletal:      Cervical back: Normal range of motion.   Skin:     General: Skin is warm.      Capillary Refill: Capillary refill takes less than 2 seconds.      Coloration: Skin is not pale.      Comments: 1.5cm linear laceration on right thumb. Superficial, not contaminated. ROM intact.    Neurological:      General: No focal deficit present.      Mental Status: He is alert and oriented to person, place, and time.   Psychiatric:         Mood and Affect: Mood normal.           LAB RESULTS  Lab Results (last 24 hours)        ** No results found for the last 24 hours. **              I ordered the above labs and reviewed the results    RADIOLOGY  No Radiology Exams Resulted Within Past 24 Hours    I ordered the above radiologic testing and reviewed the results    PROCEDURES  Laceration Repair    Date/Time: 12/2/2024 2:02 PM    Performed by: Sridevi Monterroso PA-C  Authorized by: Marshall Alvarez MD    Consent:     Consent obtained:  Verbal    Consent given by:  Patient  Universal protocol:     Procedure explained and questions answered to patient or proxy's satisfaction: yes      Patient identity confirmed:  Verbally with patient  Anesthesia:     Anesthesia method:  None  Laceration details:     Location:  Finger    Finger location:  R thumb    Length (cm):  1.5    Depth (mm):  2  Exploration:     Imaging outcome: foreign body not noted      Wound exploration: wound explored through full range of motion      Contaminated: no    Treatment:     Area cleansed with:  Chlorhexidine    Debridement:  None  Skin repair:     Repair method:  Tissue adhesive  Approximation:     Approximation:  Close  Repair type:     Repair type:  Simple  Post-procedure details:     Dressing:  Adhesive bandage    Procedure completion:  Tolerated well, no immediate complications        PROGRESS AND CONSULTS           MEDICAL DECISION MAKING    MDM  23-year-old male seen and evaluated for finger laceration.  Patient cut himself with a .  On arrival he is alert, oriented and in no acute distress.  On examination patient is neurovascularly intact he has normal range of motion of the finger.  Wound is thoroughly irrigated.  I applied skin glue as it is relatively superficial.  Appropriate return precautions were given.  Patient voiced understanding and was agreeable he was discharged in stable condition.       DIAGNOSIS  Final diagnoses:   Finger laceration, initial encounter       Latest Documented Vital Signs:  As of 14:03 EST  BP-   HR- 117 Temp- 98 °F  (36.7 °C) (Oral) O2 sat- 96%    DISPOSITION    Discussed pertinent findings with the patient/family.  Patient/Family voiced understanding of need to follow-up for recheck and further testing as needed.  Return to the Emergency Department warnings were given.         Medication List      No changes were made to your prescriptions during this visit.              Follow-up Information       Call  Azalea Beal APRN.    Specialties: Family Medicine, Internal Medicine, Emergency Medicine  Why: To schedule follow up appointment  Contact information:  [address] [facility] [address]  [facility] [address] [phone]                               Dictated utilizing Dragon dictation     Sridevi Monterroso PA-C  12/02/24 2094

## 2025-02-21 ENCOUNTER — APPOINTMENT (OUTPATIENT)
Dept: CT IMAGING | Facility: HOSPITAL | Age: 25
End: 2025-02-21
Payer: COMMERCIAL

## 2025-02-21 ENCOUNTER — APPOINTMENT (OUTPATIENT)
Dept: GENERAL RADIOLOGY | Facility: HOSPITAL | Age: 25
End: 2025-02-21
Payer: COMMERCIAL

## 2025-02-21 ENCOUNTER — HOSPITAL ENCOUNTER (EMERGENCY)
Facility: HOSPITAL | Age: 25
Discharge: HOME | End: 2025-02-21
Attending: EMERGENCY MEDICINE
Payer: COMMERCIAL

## 2025-02-21 VITALS
HEART RATE: 107 BPM | HEIGHT: 68 IN | WEIGHT: 230 LBS | TEMPERATURE: 98.4 F | RESPIRATION RATE: 18 BRPM | BODY MASS INDEX: 34.86 KG/M2 | OXYGEN SATURATION: 96 % | DIASTOLIC BLOOD PRESSURE: 117 MMHG | SYSTOLIC BLOOD PRESSURE: 175 MMHG

## 2025-02-21 DIAGNOSIS — S02.2XXA CLOSED FRACTURE OF NASAL BONE, INITIAL ENCOUNTER: ICD-10-CM
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
## 2025-02-21 DIAGNOSIS — S20.212A RIB CONTUSION, LEFT, INITIAL ENCOUNTER: ICD-10-CM

## 2025-02-21 DIAGNOSIS — S09.90XA INJURY OF HEAD, INITIAL ENCOUNTER: ICD-10-CM

## 2025-02-21 DIAGNOSIS — S13.9XXA CERVICAL SPRAIN, INITIAL ENCOUNTER: ICD-10-CM

## 2025-02-21 PROCEDURE — 99284 EMERGENCY DEPT VISIT MOD MDM: CPT | Performed by: EMERGENCY MEDICINE

## 2025-02-21 PROCEDURE — 70486 CT MAXILLOFACIAL W/O DYE: CPT

## 2025-02-21 PROCEDURE — 70450 CT HEAD/BRAIN W/O DYE: CPT

## 2025-02-21 PROCEDURE — 72125 CT NECK SPINE W/O DYE: CPT

## 2025-02-21 PROCEDURE — 71046 X-RAY EXAM CHEST 2 VIEWS: CPT

## 2025-02-21 PROCEDURE — 63710000001 ONDANSETRON ODT 4 MG TABLET DISPERSIBLE: Performed by: PHYSICIAN ASSISTANT

## 2025-02-21 RX ORDER — ONDANSETRON 4 MG/1
4 TABLET, ORALLY DISINTEGRATING ORAL ONCE
Status: COMPLETED | OUTPATIENT
Start: 2025-02-21 | End: 2025-02-21

## 2025-02-21 RX ORDER — IBUPROFEN 600 MG/1
600 TABLET, FILM COATED ORAL EVERY 8 HOURS PRN
Qty: 30 TABLET | Refills: 0 | Status: SHIPPED | OUTPATIENT
Start: 2025-02-21 | End: 2025-03-03

## 2025-02-21 RX ORDER — ACETAMINOPHEN 500 MG
1000 TABLET ORAL ONCE
Status: COMPLETED | OUTPATIENT
Start: 2025-02-21 | End: 2025-02-21

## 2025-02-21 RX ADMIN — ACETAMINOPHEN 1000 MG: 500 TABLET, FILM COATED ORAL at 12:56

## 2025-02-21 RX ADMIN — ONDANSETRON 4 MG: 4 TABLET, ORALLY DISINTEGRATING ORAL at 12:56

## 2025-02-21 NOTE — ED PROVIDER NOTES
Subjective   History of Present Illness  24-year-old male presents to the ER after a fall around 7 PM last night while drinking where he face planted.  Woke up this morning and he had.  Swelling does some bruising around his eyes and a bruise on the posterior left ribs.  He states it was not unwitnessed fall.  Patient vomiting today he is alert and oriented here in the ER.  Currently has no allergies to medicines, takes no medicines.  Blood pressure is a little high when he first came in today at 175/117.        Review of Systems no fever chills cough congestion chest pain shortness of breath.  No nausea vomiting diarrhea or abdominal pain.  Positive for nasal swelling and facial contusions and bruising.  Positive for neck pain.    Past Medical History:   Diagnosis Date    Anxiety     Asymptomatic microscopic hematuria 10/10/2021    10/6/2021: Repeat UA     10/12/2021: Repeat UA is normal      Depression     Insomnia     Other proteinuria 10/12/2021    10/12/2021: Repeat UA today.      Sleep disturbances        No Known Allergies    No past surgical history on file.    Family History   Problem Relation Age of Onset    Cancer Mother         Ovarian    Anxiety disorder Mother     Depression Mother     Hypertension Mother     Hypertension Father        Social History     Socioeconomic History    Marital status: Single   Tobacco Use    Smoking status: Former     Average packs/day: 0.3 packs/day for 4.2 years (1.0 ttl pk-yrs)     Types: Cigarettes     Start date: 01/2019     Passive exposure: Never    Smokeless tobacco: Never   Vaping Use    Vaping status: Never Used   Substance and Sexual Activity    Alcohol use: Yes     Alcohol/week: 40.0 standard drinks of alcohol     Types: 40 Standard drinks or equivalent per week    Drug use: Yes     Types: Marijuana     Comment: inhaled and edible    Sexual activity: Not Currently           Objective   Physical Exam  General: Mild distress  Head: Normocephalic, bruising around the  nose orbital rims of both eyes call for hematology.  no crepitus depression or hematoma noted.  EENT: TMs and external auditory canals are without erythema edema or ecchymosis or discharge of clear fluid or blood bilaterally, EOMI PERRL, nares patent without bleeding, no septal hematoma, tonsils are without erythema edema or exudate bilaterally with midline uvula  Neck: No lymphadenopathy  Spine: Cervical spine tender to palpation over C4-5 and 6 without step-off or deformity.  Chest: Clear to auscultation bilaterally without wheezing rhonchi or rales.  Tenderness to palpation of the left posterior ribs at the T4 level without crepitus depression or hematoma.  Cardiovascular: Regular rate and rhythm without murmur, no extremity edema.  Abdomen: Soft nontender good bowel sounds and no hernia noted.  Extremities: Full active range of motion with good distal pulses.  Neuro: Alert and oriented x 3  Psych: Bilateral cooperative  Skin: Bruising as noted on the facial exam, and there is a bruise on the right bicep with no bony tenderness.    Procedures           ED Course                                                       Medical Decision Making  Patient presented to the ER after he fell while drinking last night and it was unwitnessed striking his face.  Ordered CT of the head, patient Khari, and the C-spine with a chest x-ray.  CT of head was negative, CT facial bones shows a comminuted nasal bone fracture, cervical spine negative, and x-rays of ribs and chest are negative.  Will refer to ENT for follow-up.    Differential diagnosis: Skull fracture, head bleed, cervical spine fracture, orbital floor fracture    Amount and/or Complexity of Data Reviewed  Radiology: ordered.    Risk  OTC drugs.  Prescription drug management.        Final diagnoses:   Fall, initial encounter   Closed fracture of nasal bone, initial encounter   Injury of head, initial encounter   Cervical sprain, initial encounter   Rib contusion, left,  initial encounter       ED Disposition  ED Disposition       ED Disposition   Discharge    Condition   Stable    Comment   --               PATIENT CONNECTION - [place] [id number] [phone]    Request follow-up with ENT for nasal bone fracture    Azalea Beal, APRN  [address]  [address]  [place] [address] [phone]      Will follow-up for nasal bone fracture         Medication List      No changes were made to your prescriptions during this visit.            Marcus Little, PA-C  02/21/25 1317

## 2025-02-21 NOTE — DISCHARGE INSTRUCTIONS
Motrin as needed for pain and inflammation.  You can apply ice to affected area 3 times daily.  Avoid blowing your nose.  You can call the patient patient connection to follow-up with an ENT doctor for the nasal bone fracture.  He can also follow-up with family doctor for ENT referral as well.

## 2025-02-21 NOTE — Clinical Note
[facility] EMERGENCY DEPARTMENT  [address] [address]  Phone: [phone]    Luis Harris was seen and treated in our emergency department on 2/21/2025.  He may return to work on 02/25/2025.  Please excuse from work due to injury.       Thank you for choosing [facility].    Marcus Little PA-C